# Patient Record
Sex: FEMALE | Race: WHITE | Employment: OTHER | URBAN - METROPOLITAN AREA
[De-identification: names, ages, dates, MRNs, and addresses within clinical notes are randomized per-mention and may not be internally consistent; named-entity substitution may affect disease eponyms.]

---

## 2018-11-09 ENCOUNTER — HOSPITAL ENCOUNTER (OUTPATIENT)
Dept: ULTRASOUND IMAGING | Age: 83
Discharge: HOME | End: 2018-11-09
Payer: MEDICARE

## 2018-11-09 ENCOUNTER — HOSPITAL ENCOUNTER (OUTPATIENT)
Dept: WOMENS IMAGING | Age: 83
Discharge: HOME | End: 2018-11-09
Payer: MEDICARE

## 2018-11-09 DIAGNOSIS — N64.4 PAIN OF RIGHT BREAST: ICD-10-CM

## 2018-11-09 DIAGNOSIS — N64.4 BREAST PAIN: ICD-10-CM

## 2018-11-09 PROCEDURE — 76642 ULTRASOUND BREAST LIMITED: CPT

## 2018-11-09 PROCEDURE — G0279 TOMOSYNTHESIS, MAMMO: HCPCS

## 2020-02-28 PROBLEM — N18.4 CHRONIC KIDNEY DISEASE, STAGE IV (SEVERE) (HCC): Status: ACTIVE | Noted: 2020-02-28

## 2020-02-28 PROBLEM — D63.1 ANEMIA IN STAGE 4 CHRONIC KIDNEY DISEASE (HCC): Status: ACTIVE | Noted: 2020-02-28

## 2020-02-28 PROBLEM — N18.4 ANEMIA IN STAGE 4 CHRONIC KIDNEY DISEASE (HCC): Status: ACTIVE | Noted: 2020-02-28

## 2020-04-14 RX ORDER — DIPHENHYDRAMINE HYDROCHLORIDE 50 MG/ML
50 INJECTION INTRAMUSCULAR; INTRAVENOUS ONCE
Status: CANCELLED | OUTPATIENT
Start: 2020-04-22

## 2020-04-14 RX ORDER — SODIUM CHLORIDE 9 MG/ML
INJECTION, SOLUTION INTRAVENOUS CONTINUOUS
Status: CANCELLED | OUTPATIENT
Start: 2020-04-22

## 2020-04-14 RX ORDER — SODIUM CHLORIDE 0.9 % (FLUSH) 0.9 %
10 SYRINGE (ML) INJECTION PRN
Status: CANCELLED | OUTPATIENT
Start: 2020-04-22

## 2020-04-14 RX ORDER — METHYLPREDNISOLONE SODIUM SUCCINATE 125 MG/2ML
125 INJECTION, POWDER, LYOPHILIZED, FOR SOLUTION INTRAMUSCULAR; INTRAVENOUS ONCE
Status: CANCELLED | OUTPATIENT
Start: 2020-04-22

## 2020-04-14 RX ORDER — EPINEPHRINE 1 MG/ML
0.3 INJECTION, SOLUTION, CONCENTRATE INTRAVENOUS PRN
Status: CANCELLED | OUTPATIENT
Start: 2020-04-22

## 2020-05-13 ENCOUNTER — HOSPITAL ENCOUNTER (OUTPATIENT)
Dept: INFUSION THERAPY | Age: 85
Setting detail: INFUSION SERIES
Discharge: HOME | End: 2020-05-13
Payer: MEDICARE

## 2020-05-13 VITALS
DIASTOLIC BLOOD PRESSURE: 72 MMHG | SYSTOLIC BLOOD PRESSURE: 163 MMHG | RESPIRATION RATE: 18 BRPM | TEMPERATURE: 97.4 F | OXYGEN SATURATION: 100 % | HEART RATE: 72 BPM

## 2020-05-13 DIAGNOSIS — N18.4 CHRONIC KIDNEY DISEASE, STAGE IV (SEVERE) (HCC): ICD-10-CM

## 2020-05-13 DIAGNOSIS — D63.1 ANEMIA IN STAGE 4 CHRONIC KIDNEY DISEASE (HCC): Primary | ICD-10-CM

## 2020-05-13 DIAGNOSIS — N18.4 ANEMIA IN STAGE 4 CHRONIC KIDNEY DISEASE (HCC): Primary | ICD-10-CM

## 2020-05-13 PROCEDURE — 96365 THER/PROPH/DIAG IV INF INIT: CPT

## 2020-05-13 PROCEDURE — 6360000002 HC RX W HCPCS: Performed by: INTERNAL MEDICINE

## 2020-05-13 PROCEDURE — 2580000003 HC RX 258: Performed by: INTERNAL MEDICINE

## 2020-05-13 RX ORDER — SODIUM CHLORIDE 0.9 % (FLUSH) 0.9 %
10 SYRINGE (ML) INJECTION PRN
Status: DISCONTINUED | OUTPATIENT
Start: 2020-05-13 | End: 2020-05-14 | Stop reason: HOSPADM

## 2020-05-13 RX ORDER — AMLODIPINE BESYLATE 10 MG/1
10 TABLET ORAL ONCE
COMMUNITY
Start: 2020-05-07

## 2020-05-13 RX ORDER — SODIUM CHLORIDE 9 MG/ML
INJECTION, SOLUTION INTRAVENOUS CONTINUOUS
Status: CANCELLED | OUTPATIENT
Start: 2020-05-20

## 2020-05-13 RX ORDER — EPINEPHRINE 1 MG/ML
0.3 INJECTION, SOLUTION, CONCENTRATE INTRAVENOUS PRN
Status: CANCELLED | OUTPATIENT
Start: 2020-05-20

## 2020-05-13 RX ORDER — SODIUM CHLORIDE 0.9 % (FLUSH) 0.9 %
10 SYRINGE (ML) INJECTION PRN
Status: CANCELLED | OUTPATIENT
Start: 2020-05-20

## 2020-05-13 RX ORDER — DIPHENHYDRAMINE HYDROCHLORIDE 50 MG/ML
50 INJECTION INTRAMUSCULAR; INTRAVENOUS ONCE
Status: CANCELLED | OUTPATIENT
Start: 2020-05-20

## 2020-05-13 RX ORDER — METHYLPREDNISOLONE SODIUM SUCCINATE 125 MG/2ML
125 INJECTION, POWDER, LYOPHILIZED, FOR SOLUTION INTRAMUSCULAR; INTRAVENOUS ONCE
Status: CANCELLED | OUTPATIENT
Start: 2020-05-20

## 2020-05-13 RX ADMIN — Medication 10 ML: at 11:45

## 2020-05-13 RX ADMIN — FERRIC CARBOXYMALTOSE INJECTION 750 MG: 50 INJECTION, SOLUTION INTRAVENOUS at 12:00

## 2020-05-13 RX ADMIN — Medication 10 ML: at 13:10

## 2020-05-13 NOTE — PROGRESS NOTES
Outpatient [address] VISIT    NAME:  Burak Patel  YOB: 1930  MEDICAL RECORD NUMBER:  [id number]  EPISODE DATE:  5/13/2020    Patient arrived to [facility]   [x] per wheelchair   [] ambulatory     Has the patient had a previous problem with Injectafer or Iron Infusions? N/A  Any current infection or illness? KIDNEY DISEASE   Patient on antibiotics? No   History of Hypertension? YES    Is the patient experiencing any:  Fatigue:   []   None  [x]   Increase over baseline but not altering normal activities  []   Moderate of causing difficulty performing some activities  []   Severe or loss of ability to perform some activities  []   Bedridden or disabling     Dizziness or Lightheadedness:  [x]   None  []   No Interference  []   Interferes with functioning but not activities of daily living  []   Interferes with daily activies  []   Bedridden or disabling     Shortness of Breath:   []   None   [x]   Dyspneic on exertion   []   Dyspnea with normal activities  []   Dyspnea at rest    Edema: no  Tachycardia: no.  Heart Palpitations: no.    Chest Pain: no.   Current Lab Data:  Hemoglobin/Hematocrit:    8.5 / done at [facility] and rec'd via fax. On 5/8/2020    Iron Saturation:    Lab Results   Component Value Date    LABIRON 9 03/03/2020     TIBC:    Lab Results   Component Value Date    TIBC 499 03/03/2020     FERRITIN:    Lab Results   Component Value Date    FERRITIN 12.1 03/03/2020       Dose Administered: 750 mgs. Todays dose is number 1 out of 2 ordered for this patient. Denies any pain , rash or adverse effects. Response to treatment:  Well tolerated by patient. Education:    Needs reinforcement  Scheduled to return for next dose of Injectafer on May 20, 2020. Discharged with her son to [facility].   Electronically signed by Jarett Ramachandran RN on 5/13/2020 at 3:05 PM

## 2020-05-20 ENCOUNTER — HOSPITAL ENCOUNTER (OUTPATIENT)
Dept: INFUSION THERAPY | Age: 85
Setting detail: INFUSION SERIES
End: 2020-05-20
Payer: MEDICARE

## 2020-05-21 ENCOUNTER — HOSPITAL ENCOUNTER (OUTPATIENT)
Dept: INFUSION THERAPY | Age: 85
Setting detail: INFUSION SERIES
End: 2020-05-21
Payer: MEDICARE

## 2020-05-22 ENCOUNTER — HOSPITAL ENCOUNTER (OUTPATIENT)
Dept: INFUSION THERAPY | Age: 85
Setting detail: INFUSION SERIES
Discharge: HOME | End: 2020-05-22
Payer: MEDICARE

## 2020-05-22 VITALS
OXYGEN SATURATION: 97 % | DIASTOLIC BLOOD PRESSURE: 69 MMHG | HEART RATE: 83 BPM | SYSTOLIC BLOOD PRESSURE: 156 MMHG | TEMPERATURE: 98.6 F | RESPIRATION RATE: 16 BRPM

## 2020-05-22 DIAGNOSIS — N18.4 ANEMIA IN STAGE 4 CHRONIC KIDNEY DISEASE (HCC): Primary | ICD-10-CM

## 2020-05-22 DIAGNOSIS — N18.4 CHRONIC KIDNEY DISEASE, STAGE IV (SEVERE) (HCC): ICD-10-CM

## 2020-05-22 DIAGNOSIS — D63.1 ANEMIA IN STAGE 4 CHRONIC KIDNEY DISEASE (HCC): Primary | ICD-10-CM

## 2020-05-22 PROCEDURE — 96365 THER/PROPH/DIAG IV INF INIT: CPT

## 2020-05-22 PROCEDURE — 6360000002 HC RX W HCPCS: Performed by: INTERNAL MEDICINE

## 2020-05-22 PROCEDURE — 2580000003 HC RX 258: Performed by: INTERNAL MEDICINE

## 2020-05-22 RX ORDER — SEVELAMER HYDROCHLORIDE 800 MG/1
800 TABLET, FILM COATED ORAL
COMMUNITY

## 2020-05-22 RX ORDER — DIPHENHYDRAMINE HYDROCHLORIDE 50 MG/ML
50 INJECTION INTRAMUSCULAR; INTRAVENOUS ONCE
Status: CANCELLED | OUTPATIENT
Start: 2020-05-22

## 2020-05-22 RX ORDER — METHYLPREDNISOLONE SODIUM SUCCINATE 125 MG/2ML
125 INJECTION, POWDER, LYOPHILIZED, FOR SOLUTION INTRAMUSCULAR; INTRAVENOUS ONCE
Status: CANCELLED | OUTPATIENT
Start: 2020-05-22

## 2020-05-22 RX ORDER — CALCIFEDIOL 30 UG/1
30 CAPSULE, EXTENDED RELEASE ORAL DAILY
COMMUNITY

## 2020-05-22 RX ORDER — EPINEPHRINE 1 MG/ML
0.3 INJECTION, SOLUTION, CONCENTRATE INTRAVENOUS PRN
Status: CANCELLED | OUTPATIENT
Start: 2020-05-22

## 2020-05-22 RX ORDER — SODIUM CHLORIDE 0.9 % (FLUSH) 0.9 %
10 SYRINGE (ML) INJECTION PRN
Status: DISCONTINUED | OUTPATIENT
Start: 2020-05-22 | End: 2020-05-22 | Stop reason: ALTCHOICE

## 2020-05-22 RX ORDER — SODIUM CHLORIDE 9 MG/ML
INJECTION, SOLUTION INTRAVENOUS CONTINUOUS
Status: CANCELLED | OUTPATIENT
Start: 2020-05-22

## 2020-05-22 RX ORDER — SODIUM BICARBONATE 650 MG/1
650 TABLET ORAL 3 TIMES DAILY
COMMUNITY

## 2020-05-22 RX ORDER — SODIUM CHLORIDE 0.9 % (FLUSH) 0.9 %
10 SYRINGE (ML) INJECTION PRN
Status: CANCELLED | OUTPATIENT
Start: 2020-05-22

## 2020-05-22 RX ADMIN — Medication 10 ML: at 11:40

## 2020-05-22 RX ADMIN — FERRIC CARBOXYMALTOSE INJECTION 750 MG: 50 INJECTION, SOLUTION INTRAVENOUS at 12:48

## 2020-05-22 RX ADMIN — Medication 10 ML: at 14:03

## 2020-05-22 RX ADMIN — Medication 10 ML: at 12:48

## 2020-05-22 RX ADMIN — Medication 10 ML: at 14:28

## 2020-05-22 ASSESSMENT — PAIN SCALES - GENERAL
PAINLEVEL_OUTOF10: 0

## 2020-05-22 NOTE — PROGRESS NOTES
Outpatient [address] VISIT    NAME:  Ray Hirsch  YOB: 1930  MEDICAL RECORD NUMBER:  [id number]  EPISODE DATE:  5/22/2020    Patient arrived to [facility]   [] per wheelchair   [x] ambulatory     Has the patient had a previous problem with Injectafer or Iron Infusions? No  Any current infection or illness? No   Patient on antibiotics? No   History of Hypertension? Yes - Takes Amlodipine daily    Is the patient experiencing any:  Fatigue:   []   None  []   Increase over baseline but not altering normal activities  [x]   Moderate of causing difficulty performing some activities - states that she has not been sleeping well because she has some \"rib pain\" when she lies down to go to sleep. Staes that this is making her very tired today. []   Severe or loss of ability to perform some activities  []   Bedridden or disabling     Dizziness or Lightheadedness:   [x]   None - states that she never gets dizzy or lightheaded  []   No Interference  []   Interferes with functioning but not activities of daily living  []   Interferes with daily activies  []   Bedridden or disabling     Shortness of Breath:   []   None   [x]   Dyspneic on exertion - when climbing stairs  []   Dyspnea with normal activities  []   Dyspnea at rest    Edema: none noted    Tachycardia: No    Heart Palpitations: No      Chest Pain: No      BP (!) 156/69   Pulse 83   Temp 98.6 °F (37 °C) (Oral)   Resp 16   SpO2 97%   No data found.     Current Lab Data: (drawn 4/28/2020 at Dr Casper Najjar office - full report scanned in Epic)  Hemoglobin/Hematocrit: 8.7/26.4  Iron: 133  Iron Binding Capacity: 419  Iron %saturation: 32  Ferritin: 32      Past Lab Data:  Hemoglobin/Hematocrit:    Lab Results   Component Value Date    HGB 9.0 02/21/2020    HCT 27.5 02/21/2020     IRON:    Lab Results   Component Value Date    IRON 46 03/03/2020     Iron Saturation:    Lab Results   Component Value Date    LABIRON 9 03/03/2020     TIBC:    Lab Results   Component Value Date    TIBC 499 03/03/2020     FERRITIN:    Lab Results   Component Value Date    FERRITIN 12.1 03/03/2020       Dose Administered: 750 mg  Todays dose is number 2 out of 2 ordered for this patient. Response to treatment:  Well tolerated by patient. Education:    Verbal and written discharge instructions reviewed with patient. Verbalized understanding.  Written copy given via AVS    Follow up with Dr Teresa Alvares     Electronically signed by Corbin Crow RN on 5/22/2020 at 6:04 PM

## 2020-09-22 ENCOUNTER — HOSPITAL ENCOUNTER (OUTPATIENT)
Age: 85
Discharge: HOME | End: 2020-09-22
Payer: MEDICARE

## 2020-09-22 ENCOUNTER — HOSPITAL ENCOUNTER (OUTPATIENT)
Dept: GENERAL RADIOLOGY | Age: 85
Discharge: HOME | End: 2020-09-22
Payer: MEDICARE

## 2020-09-22 LAB
HBV SURFACE AB TITR SER: 13.07 MIU/ML
HEPATITIS B CORE IGM ANTIBODY: NORMAL
HEPATITIS B SURFACE ANTIGEN INTERPRETATION: NORMAL

## 2020-09-22 PROCEDURE — 71046 X-RAY EXAM CHEST 2 VIEWS: CPT

## 2020-09-22 PROCEDURE — 36415 COLL VENOUS BLD VENIPUNCTURE: CPT

## 2020-09-22 PROCEDURE — 87340 HEPATITIS B SURFACE AG IA: CPT

## 2020-09-22 PROCEDURE — 86706 HEP B SURFACE ANTIBODY: CPT

## 2020-09-22 PROCEDURE — 86705 HEP B CORE ANTIBODY IGM: CPT
